# Patient Record
Sex: MALE | Race: WHITE | Employment: FULL TIME | URBAN - METROPOLITAN AREA
[De-identification: names, ages, dates, MRNs, and addresses within clinical notes are randomized per-mention and may not be internally consistent; named-entity substitution may affect disease eponyms.]

---

## 2017-09-22 PROCEDURE — 86780 TREPONEMA PALLIDUM: CPT | Performed by: INTERNAL MEDICINE

## 2017-09-22 PROCEDURE — 84153 ASSAY OF PSA TOTAL: CPT | Performed by: INTERNAL MEDICINE

## 2017-09-22 PROCEDURE — 82746 ASSAY OF FOLIC ACID SERUM: CPT | Performed by: INTERNAL MEDICINE

## 2017-09-22 PROCEDURE — 82607 VITAMIN B-12: CPT | Performed by: INTERNAL MEDICINE

## 2017-09-22 PROCEDURE — 83921 ORGANIC ACID SINGLE QUANT: CPT | Performed by: INTERNAL MEDICINE

## 2018-01-03 PROBLEM — G56.01 CARPAL TUNNEL SYNDROME OF RIGHT WRIST: Status: ACTIVE | Noted: 2018-01-03

## 2018-04-05 PROBLEM — G56.02 CARPAL TUNNEL SYNDROME, LEFT: Status: ACTIVE | Noted: 2018-04-05

## 2019-02-26 ENCOUNTER — OFFICE VISIT (OUTPATIENT)
Dept: SURGERY | Facility: CLINIC | Age: 59
End: 2019-02-26
Payer: COMMERCIAL

## 2019-02-26 VITALS
BODY MASS INDEX: 27.09 KG/M2 | HEIGHT: 72 IN | DIASTOLIC BLOOD PRESSURE: 76 MMHG | HEART RATE: 80 BPM | WEIGHT: 200 LBS | RESPIRATION RATE: 16 BRPM | SYSTOLIC BLOOD PRESSURE: 119 MMHG | TEMPERATURE: 98 F

## 2019-02-26 DIAGNOSIS — I10 ESSENTIAL HYPERTENSION: ICD-10-CM

## 2019-02-26 DIAGNOSIS — Z01.818 PRE-OP TESTING: Primary | ICD-10-CM

## 2019-02-26 DIAGNOSIS — L72.3 SEBACEOUS CYST: Primary | ICD-10-CM

## 2019-02-26 PROCEDURE — 99243 OFF/OP CNSLTJ NEW/EST LOW 30: CPT | Performed by: SURGERY

## 2019-02-26 NOTE — H&P
New Patient Visit Note       Active Problems      1. Sebaceous cyst    2.  Essential hypertension        Chief Complaint   Patient presents with:  Mass: NW PT ref by DR Basil Love for mass on chest. PT states that he has had cyst for a couple of weeks with some (100 mg total) by mouth daily. , Disp: 90 tablet, Rfl: 0  •  losartan 100 MG Oral Tab, Take 1 tablet (100 mg total) by mouth once daily. , Disp: 30 tablet, Rfl: 0  •  Cyanocobalamin (VITAMIN B 12 OR), Take by mouth., Disp: , Rfl:   •  Fluticasone Propionate normal heart sounds. Pulmonary/Chest: Effort normal and breath sounds normal. No respiratory distress. He has no wheezes. Skin: He is not diaphoretic. 2.5 x 2.5 cm well-circumscribed, freely mobile, non-tender cyst of the chest wall.    Nursing n

## 2019-03-05 ENCOUNTER — APPOINTMENT (OUTPATIENT)
Dept: LAB | Facility: HOSPITAL | Age: 59
End: 2019-03-05
Attending: SURGERY
Payer: COMMERCIAL

## 2019-03-05 DIAGNOSIS — Z01.818 PRE-OP TESTING: ICD-10-CM

## 2019-03-05 LAB
ALBUMIN SERPL-MCNC: 4 G/DL (ref 3.4–5)
ALBUMIN/GLOB SERPL: 1.2 {RATIO} (ref 1–2)
ALP LIVER SERPL-CCNC: 76 U/L (ref 45–117)
ALT SERPL-CCNC: 94 U/L (ref 16–61)
ANION GAP SERPL CALC-SCNC: 6 MMOL/L (ref 0–18)
AST SERPL-CCNC: 33 U/L (ref 15–37)
ATRIAL RATE: 72 BPM
BILIRUB SERPL-MCNC: 0.5 MG/DL (ref 0.1–2)
BUN BLD-MCNC: 17 MG/DL (ref 7–18)
BUN/CREAT SERPL: 17.3 (ref 10–20)
CALCIUM BLD-MCNC: 9.7 MG/DL (ref 8.5–10.1)
CHLORIDE SERPL-SCNC: 105 MMOL/L (ref 98–107)
CO2 SERPL-SCNC: 30 MMOL/L (ref 21–32)
CREAT BLD-MCNC: 0.98 MG/DL (ref 0.7–1.3)
GLOBULIN PLAS-MCNC: 3.4 G/DL (ref 2.8–4.4)
GLUCOSE BLD-MCNC: 107 MG/DL (ref 70–99)
M PROTEIN MFR SERPL ELPH: 7.4 G/DL (ref 6.4–8.2)
OSMOLALITY SERPL CALC.SUM OF ELEC: 294 MOSM/KG (ref 275–295)
P AXIS: 6 DEGREES
P-R INTERVAL: 124 MS
POTASSIUM SERPL-SCNC: 4.8 MMOL/L (ref 3.5–5.1)
Q-T INTERVAL: 408 MS
QRS DURATION: 146 MS
QTC CALCULATION (BEZET): 446 MS
R AXIS: -16 DEGREES
SODIUM SERPL-SCNC: 141 MMOL/L (ref 136–145)
T AXIS: 8 DEGREES
VENTRICULAR RATE: 72 BPM

## 2019-03-05 PROCEDURE — 93005 ELECTROCARDIOGRAM TRACING: CPT

## 2019-03-05 PROCEDURE — 36415 COLL VENOUS BLD VENIPUNCTURE: CPT | Performed by: SURGERY

## 2019-03-05 PROCEDURE — 80053 COMPREHEN METABOLIC PANEL: CPT | Performed by: SURGERY

## 2019-03-05 PROCEDURE — 93010 ELECTROCARDIOGRAM REPORT: CPT | Performed by: INTERNAL MEDICINE

## 2019-03-14 ENCOUNTER — LAB REQUISITION (OUTPATIENT)
Dept: LAB | Facility: HOSPITAL | Age: 59
End: 2019-03-14
Payer: COMMERCIAL

## 2019-03-14 DIAGNOSIS — L72.3 SEBACEOUS CYST: ICD-10-CM

## 2019-03-14 PROCEDURE — 88304 TISSUE EXAM BY PATHOLOGIST: CPT | Performed by: SURGERY

## 2019-03-28 ENCOUNTER — OFFICE VISIT (OUTPATIENT)
Dept: SURGERY | Facility: CLINIC | Age: 59
End: 2019-03-28
Payer: COMMERCIAL

## 2019-03-28 VITALS
SYSTOLIC BLOOD PRESSURE: 148 MMHG | WEIGHT: 200 LBS | HEART RATE: 64 BPM | TEMPERATURE: 98 F | BODY MASS INDEX: 27.09 KG/M2 | RESPIRATION RATE: 18 BRPM | DIASTOLIC BLOOD PRESSURE: 84 MMHG | HEIGHT: 72 IN

## 2019-03-28 DIAGNOSIS — L72.3 SEBACEOUS CYST: Primary | ICD-10-CM

## 2019-03-28 DIAGNOSIS — D36.14 NEUROFIBROMA OF THORACIC REGION: ICD-10-CM

## 2019-03-28 PROCEDURE — 99212 OFFICE O/P EST SF 10 MIN: CPT | Performed by: PHYSICIAN ASSISTANT

## 2019-03-28 RX ORDER — CHOLECALCIFEROL (VITAMIN D3) 1MM UNIT/G
LIQUID (GRAM) MISCELLANEOUS
COMMUNITY
End: 2021-08-10

## 2019-03-28 NOTE — PROGRESS NOTES
Post Operative Visit Note       Active Problems  1. Sebaceous cyst    2.  Neurofibroma of thoracic region       Chief Complaint   Patient presents with:  Post-Op: RRP-sebaceous cyst on 3/14/19 Pt states that he feels fine denies any pain      History of Pre weekly      Drug use: No       Current Outpatient Medications:   •  Cholecalciferol (VITAMIN D3) Does not apply Liquid, by Does not apply route., Disp: , Rfl:   •  losartan 100 MG Oral Tab, Take 1 tablet (100 mg total) by mouth once daily. , Disp: 30 tablet He is oriented to person, place, and time. He appears well-developed and well-nourished. No distress. HENT:   Head: Normocephalic. Mouth/Throat: Oropharynx is clear and moist.   Eyes: Conjunctivae and EOM are normal. No scleral icterus.    Neck: Normal

## 2019-05-06 PROCEDURE — 88305 TISSUE EXAM BY PATHOLOGIST: CPT | Performed by: INTERNAL MEDICINE

## 2019-07-21 ENCOUNTER — HOSPITAL ENCOUNTER (EMERGENCY)
Facility: HOSPITAL | Age: 59
Discharge: HOME | End: 2019-07-21
Attending: EMERGENCY MEDICINE
Payer: COMMERCIAL

## 2019-07-21 ENCOUNTER — APPOINTMENT (OUTPATIENT)
Dept: GENERAL RADIOLOGY | Facility: HOSPITAL | Age: 59
End: 2019-07-21
Payer: COMMERCIAL

## 2019-07-21 VITALS
TEMPERATURE: 98 F | HEIGHT: 72 IN | OXYGEN SATURATION: 97 % | BODY MASS INDEX: 26.01 KG/M2 | SYSTOLIC BLOOD PRESSURE: 159 MMHG | RESPIRATION RATE: 18 BRPM | DIASTOLIC BLOOD PRESSURE: 89 MMHG | HEART RATE: 71 BPM | WEIGHT: 192 LBS

## 2019-07-21 DIAGNOSIS — M54.9 BACK PAIN WITH RADIATION: Primary | ICD-10-CM

## 2019-07-21 PROCEDURE — 99284 EMERGENCY DEPT VISIT MOD MDM: CPT

## 2019-07-21 PROCEDURE — 99283 EMERGENCY DEPT VISIT LOW MDM: CPT

## 2019-07-21 PROCEDURE — 96372 THER/PROPH/DIAG INJ SC/IM: CPT

## 2019-07-21 PROCEDURE — 72110 X-RAY EXAM L-2 SPINE 4/>VWS: CPT | Performed by: EMERGENCY MEDICINE

## 2019-07-21 RX ORDER — METHYLPREDNISOLONE 4 MG/1
TABLET ORAL
Qty: 1 PACKAGE | Refills: 0 | Status: SHIPPED | OUTPATIENT
Start: 2019-07-21 | End: 2019-07-26

## 2019-07-21 RX ORDER — KETOROLAC TROMETHAMINE 30 MG/ML
15 INJECTION, SOLUTION INTRAMUSCULAR; INTRAVENOUS ONCE
Status: COMPLETED | OUTPATIENT
Start: 2019-07-21 | End: 2019-07-21

## 2019-07-21 RX ORDER — CYCLOBENZAPRINE HCL 10 MG
10 TABLET ORAL 3 TIMES DAILY PRN
Qty: 20 TABLET | Refills: 0 | Status: SHIPPED | OUTPATIENT
Start: 2019-07-21 | End: 2019-07-26

## 2019-07-21 RX ORDER — CYCLOBENZAPRINE HCL 10 MG
10 TABLET ORAL 3 TIMES DAILY PRN
Status: DISCONTINUED | OUTPATIENT
Start: 2019-07-21 | End: 2019-07-21

## 2019-07-21 NOTE — ED PROVIDER NOTES
Patient Seen in: [facility] Emergency Department    History   Patient presents with:  Back Pain (musculoskeletal)    Stated Complaint: back pain    HPI    67-year-old male complaining of low back pain the patient states he had some hip pain a couple 98.3 °F (36.8 °C)   Temp src Temporal   SpO2 96 %   O2 Device None (Room air)       Current:/89   Pulse 75   Temp 98.3 °F (36.8 °C) (Temporal)   Resp 18   Ht 182.9 cm (6')   Wt 87.1 kg   SpO2 96%   BMI 26.04 kg/m²         Physical Exam    Patient is spasms.   Qty: 20 tablet Refills: 0    methylPREDNISolone 4 MG Oral Tablet Therapy Pack  Dosepack: use as directed on packaging  Qty: 1 Package Refills: 0

## 2019-07-21 NOTE — ED INITIAL ASSESSMENT (HPI)
Pt here due to right lower back pain that has been going on for the past couple of weeks but this past Wednesday it went way up in pain

## 2019-07-25 PROBLEM — M54.41 ACUTE BILATERAL LOW BACK PAIN WITH RIGHT-SIDED SCIATICA: Status: ACTIVE | Noted: 2019-07-25

## 2021-07-12 ENCOUNTER — HOSPITAL ENCOUNTER (EMERGENCY)
Facility: HOSPITAL | Age: 61
Discharge: HOME | End: 2021-07-12
Attending: EMERGENCY MEDICINE
Payer: COMMERCIAL

## 2021-07-12 VITALS
SYSTOLIC BLOOD PRESSURE: 164 MMHG | HEART RATE: 75 BPM | BODY MASS INDEX: 27.09 KG/M2 | TEMPERATURE: 98 F | OXYGEN SATURATION: 96 % | DIASTOLIC BLOOD PRESSURE: 108 MMHG | WEIGHT: 200 LBS | HEIGHT: 72 IN | RESPIRATION RATE: 17 BRPM

## 2021-07-12 DIAGNOSIS — R04.0 EPISTAXIS: Primary | ICD-10-CM

## 2021-07-12 PROCEDURE — 30903 CONTROL OF NOSEBLEED: CPT

## 2021-07-12 PROCEDURE — 99284 EMERGENCY DEPT VISIT MOD MDM: CPT

## 2021-07-12 RX ORDER — AMOXICILLIN AND CLAVULANATE POTASSIUM 875; 125 MG/1; MG/1
1 TABLET, FILM COATED ORAL 2 TIMES DAILY
Qty: 14 TABLET | Refills: 0 | Status: SHIPPED | OUTPATIENT
Start: 2021-07-12 | End: 2021-07-19

## 2021-07-12 NOTE — ED PROVIDER NOTES
Patient Seen in: [facility] Emergency Department      History   Patient presents with:  Nose Bleed    Stated Complaint: nose bleed    HPI/Subjective:   HPI    61-year-old male presents for evaluation of nosebleed.   Patient woke up this morning with anterior septum was identified due to ongoing bleeding. After packing, right nare would occasionally drip but the patient did not want right side packed which then would require admission. Case discussed with Dr. Odilon Wilson ENT.   She came to see the patient i

## 2021-07-12 NOTE — CONSULTS
[facility]  Report of Otolaryngology Consultation    Abbey Laboy Patient Status:  Emergency    10/28/1960 MRN [id number]   Location [address] [facility] Attending Ynes Triplett, [facility] Day # 0 PCP Joseph Joiner MD     R alzheimer   • Hypertension Brother        Social History:  Social History    Socioeconomic History      Marital status:       Spouse name: Not on file      Number of children: Not on file      Years of education: Not on file      Highest education l (Temporal)   Resp 18   Ht 6' (1.829 m)   Wt 200 lb (90.7 kg)   SpO2 98%   BMI 27.12 kg/m²     GENERAL APPEARANCE: Well developed and Comfortable. COMMUNICATION: Voice strong with normal ability to communicate.   HEAD AND FACE: Normocephalic, Face symmetric Tobacco use disorder [stopped 2006 but restarted]     ROUTINE GENERAL EXAM - 10/2/17*     Hyperlipidemia / Rx: none LDL @ Goal < 130 w/o Rx     Environmental and seasonal allergies [Spring] / Rx: Fluticasone     Actinic keratosis - KAREEM Vargas

## 2021-12-09 PROBLEM — M77.8 RIGHT WRIST TENDONITIS: Status: ACTIVE | Noted: 2021-12-09

## 2021-12-09 PROBLEM — M19.032 ARTHRITIS OF WRIST, LEFT: Status: ACTIVE | Noted: 2021-12-09

## (undated) NOTE — ED AVS SNAPSHOT
Maria Isabel King   MRN: [id number]    Department:  [facility] Emergency Department   Date of Visit:  7/21/2019           Disclosure     Insurance plans vary and the physician(s) referred by the ER may not be covered by your plan.  Please contact your in tell this physician (or your personal doctor if your instructions are to return to your personal doctor) about any new or lasting problems. The primary care or specialist physician will see patients referred from the [facility] Emergency Department.  Kristi Colindres